# Patient Record
Sex: MALE | Race: WHITE
[De-identification: names, ages, dates, MRNs, and addresses within clinical notes are randomized per-mention and may not be internally consistent; named-entity substitution may affect disease eponyms.]

---

## 2018-07-31 ENCOUNTER — HOSPITAL ENCOUNTER (OUTPATIENT)
Dept: HOSPITAL 45 - C.CPL | Age: 58
Discharge: HOME | End: 2018-07-31
Attending: ORTHOPAEDIC SURGERY
Payer: COMMERCIAL
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
## 2018-07-31 DIAGNOSIS — Z01.812: ICD-10-CM

## 2018-07-31 DIAGNOSIS — Z01.810: ICD-10-CM

## 2018-07-31 DIAGNOSIS — S43.086A: Primary | ICD-10-CM

## 2018-07-31 LAB
BASOPHILS # BLD: 0.03 K/UL (ref 0–0.2)
BASOPHILS NFR BLD: 0.4 %
BUN SERPL-MCNC: 14 MG/DL (ref 7–18)
CALCIUM SERPL-MCNC: 8.6 MG/DL (ref 8.5–10.1)
CO2 SERPL-SCNC: 29 MMOL/L (ref 21–32)
CREAT SERPL-MCNC: 1.02 MG/DL (ref 0.6–1.4)
EOS ABS #: 0.28 K/UL (ref 0–0.5)
EOSINOPHIL NFR BLD AUTO: 163 K/UL (ref 130–400)
GLUCOSE SERPL-MCNC: 213 MG/DL (ref 70–99)
HCT VFR BLD CALC: 46.9 % (ref 42–52)
HGB BLD-MCNC: 16.6 G/DL (ref 14–18)
IG#: 0.02 K/UL (ref 0–0.02)
IMM GRANULOCYTES NFR BLD AUTO: 23.5 %
LYMPHOCYTES # BLD: 1.84 K/UL (ref 1.2–3.4)
MCH RBC QN AUTO: 31.6 PG (ref 25–34)
MCHC RBC AUTO-ENTMCNC: 35.4 G/DL (ref 32–36)
MCV RBC AUTO: 89.3 FL (ref 80–100)
MONO ABS #: 0.48 K/UL (ref 0.11–0.59)
MONOCYTES NFR BLD: 6.1 %
NEUT ABS #: 5.18 K/UL (ref 1.4–6.5)
NEUTROPHILS # BLD AUTO: 3.6 %
NEUTROPHILS NFR BLD AUTO: 66.1 %
PMV BLD AUTO: 9.9 FL (ref 7.4–10.4)
POTASSIUM SERPL-SCNC: 3.8 MMOL/L (ref 3.5–5.1)
RED CELL DISTRIBUTION WIDTH CV: 14.1 % (ref 11.5–14.5)
RED CELL DISTRIBUTION WIDTH SD: 46.1 FL (ref 36.4–46.3)
SODIUM SERPL-SCNC: 134 MMOL/L (ref 136–145)
WBC # BLD AUTO: 7.83 K/UL (ref 4.8–10.8)

## 2018-07-31 NOTE — PAT MEDICATION INSTRUCTIONS
Service Date


Jul 31, 2018.





Current Home Medication List


Aspirin (Aspirin), 1 TAB PO QAM


Atorvastatin (Lipitor), 80 MG PO HS


Ezetimibe (Zetia), 10 MG PO HS


Lansoprazole (Prevacid), 15 MG PO QAM


Metoprolol Tartrate (Lopressor) (Lopressor), 25 MG PO BID


Multivitamin (Multivitamin), 1 TAB PO QAM


Nitroglycerin (Nitrostat), 0.4 MG UT PRN


Tramadol (Ultram), 50 MG PO Q4H PRN for Pain


Triamterene/Hctz (Triamterene/Hctz 37.5-25MG), 1 TAB PO QAM





Medication Instructions


For Your Scheduled Surgery 





-Continue as needed for chest pain:


Nitroglycerin (Nitrostat), 0.4 MG UT PRN








- Hold the following medications the morning of surgery:


Multivitamin (Multivitamin), 1 TAB PO QAM


Triamterene/Hctz (Triamterene/Hctz 37.5-25MG), 1 TAB PO QAM








- Take the following medications the morning of surgery with a sip of water:


Aspirin (Aspirin), 1 TAB PO QAM


Lansoprazole (Prevacid), 15 MG PO QAM


Metoprolol Tartrate (Lopressor) (Lopressor), 25 MG PO BID


Tramadol (Ultram), 50 MG PO Q4H PRN for Pain (if needed, may be taken up to 

four hours before surgery)








- Take the following medications as scheduled the night before surgery:


Atorvastatin (Lipitor), 80 MG PO HS


Ezetimibe (Zetia), 10 MG PO HS


Metoprolol Tartrate (Lopressor) (Lopressor), 25 MG PO BID


Tramadol (Ultram), 50 MG PO Q4H PRN for Pain (if needed)








If you have any questions please call us at [phone] or [phone] or 

[phone]

## 2018-07-31 NOTE — DIAGNOSTIC IMAGING REPORT
CHEST 2 VIEWS ROUTINE



CLINICAL HISTORY: PAT preoperative evaluation



COMPARISON STUDY:  12/23/2013



FINDINGS: Prior median sternotomy. Lungs are clear. Diaphragms smooth. 



IMPRESSION:  No acute process. 











The above report was generated using voice recognition software.  It may contain

grammatical, syntax or spelling errors.









Electronically signed by:  Baron Moe M.D.

7/31/2018 10:36 AM



Dictated Date/Time:  7/31/2018 10:35 AM

## 2018-08-02 ENCOUNTER — HOSPITAL ENCOUNTER (OUTPATIENT)
Dept: HOSPITAL 45 - X.SURG | Age: 58
Discharge: HOME | End: 2018-08-02
Attending: ORTHOPAEDIC SURGERY
Payer: COMMERCIAL

## 2018-08-02 VITALS
WEIGHT: 183.87 LBS | BODY MASS INDEX: 27.87 KG/M2 | WEIGHT: 183.87 LBS | BODY MASS INDEX: 27.87 KG/M2 | BODY MASS INDEX: 27.87 KG/M2 | HEIGHT: 67.99 IN | HEIGHT: 67.99 IN

## 2018-08-02 VITALS — HEART RATE: 89 BPM | SYSTOLIC BLOOD PRESSURE: 127 MMHG | OXYGEN SATURATION: 94 % | DIASTOLIC BLOOD PRESSURE: 81 MMHG

## 2018-08-02 VITALS — TEMPERATURE: 97.16 F

## 2018-08-02 DIAGNOSIS — I25.2: ICD-10-CM

## 2018-08-02 DIAGNOSIS — Z95.1: ICD-10-CM

## 2018-08-02 DIAGNOSIS — K21.9: ICD-10-CM

## 2018-08-02 DIAGNOSIS — F17.200: ICD-10-CM

## 2018-08-02 DIAGNOSIS — M19.90: ICD-10-CM

## 2018-08-02 DIAGNOSIS — E78.5: ICD-10-CM

## 2018-08-02 DIAGNOSIS — M25.311: ICD-10-CM

## 2018-08-02 DIAGNOSIS — S46.011A: Primary | ICD-10-CM

## 2018-08-02 DIAGNOSIS — Z79.82: ICD-10-CM

## 2018-08-02 DIAGNOSIS — Z79.899: ICD-10-CM
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
## 2018-08-02 DIAGNOSIS — I25.10: ICD-10-CM

## 2018-08-02 NOTE — ANESTHESIA PROGRESS NT - [facility]
Anesthesia Post Op Note


Date & Time


Aug 2, 2018 at 12:35





Vital Signs


Pain Intensity:  0





Vital Signs Past 12 Hours








  Date Time  Temp Pulse Resp B/P (MAP) Pulse Ox O2 Delivery O2 Flow Rate FiO2


 


8/2/18 12:26 36.1    92 Room Air  


 


8/2/18 12:21    136/85    


 


8/2/18 12:19  87 19  91   


 


8/2/18 12:19  87 19     


 


8/2/18 12:17    138/73    


 


8/2/18 12:14  88 28  97   


 


8/2/18 12:14  89 28     


 


8/2/18 12:13  72 18  96   


 


8/2/18 12:13  72 18     


 


8/2/18 12:11    136/88    


 


8/2/18 12:08  78 22  98   


 


8/2/18 12:08  77 22     


 


8/2/18 12:06    159/90    


 


8/2/18 12:05 36.3 97 16 159/90 96 Mask 7 


 


8/2/18 10:18  70 21  97   


 


8/2/18 10:18  71      


 


8/2/18 10:17  70 21  98   


 


8/2/18 10:17  71      


 


8/2/18 10:17  70 21  98   


 


8/2/18 10:17  71      


 


8/2/18 10:16    117/82    


 


8/2/18 10:16    117/82    


 


8/2/18 10:12  67 25  99   


 


8/2/18 10:12  66      


 


8/2/18 10:12  67 25  99   


 


8/2/18 10:12  66      


 


8/2/18 10:11  65      


 


8/2/18 10:11  66 0 143/96 96   


 


8/2/18 10:06  63 0  95   


 


8/2/18 10:06  60      


 


8/2/18 10:03    127/90    


 


8/2/18 10:01  56 0  96   


 


8/2/18 10:01  56      


 


8/2/18 09:56  66      


 


8/2/18 09:56  65 0  95   


 


8/2/18 09:51  72      


 


8/2/18 09:51  69 0  95   


 


8/2/18 09:46  61 0  94   


 


8/2/18 09:46  62      


 


8/2/18 09:41  62 0  94   


 


8/2/18 09:41  63      


 


8/2/18 09:36  57 0  95   


 


8/2/18 09:36  55      


 


8/2/18 09:31  57      


 


8/2/18 09:31  56 0  94   


 


8/2/18 09:26  59      


 


8/2/18 09:26  63 0  95   


 


8/2/18 09:21  64      


 


8/2/18 09:21  64 0  96   


 


8/2/18 09:16  64      


 


8/2/18 09:16  64 0  96   


 


8/2/18 08:26 36.5 69 18 142/95 (111) 96 Room Air  











Notes


Mental Status:  alert / awake / arousable, participated in evaluation


Pt Amnestic to Procedure:  Yes


Nausea / Vomiting:  adequately controlled


Pain:  adequately controlled


Airway Patency, RR, SpO2:  stable & adequate


BP & HR:  stable & adequate


Hydration State:  stable & adequate


Anesthetic Complications:  no major complications apparent

## 2018-08-02 NOTE — [facility] POST OPERATIVE BRIEF NOTE
Immediate Operative Summary


Operative Date


Aug 2, 2018.





Pre-Operative Diagnosis





RIGHT SHOULDER FULL THICKNESS ROTATOR CUFF TEAR





Post-Operative Diagnosis





SAME AS PREOP





Procedure(s) Performed





Right Shoulder Arthroscopy With Medium Rotator Cuff Repair WITH EXTENSIVE 


DEBRIDEMENT





Surgeon


DR. KENNETH AZEEVDO





Assistant Surgeon(s)


IAM GRANDE PA-C





Estimated Blood Loss


5 ML





Findings


Consistent with Post-Op Diagnosis





Specimens





none





Anesthesia Type


[facility]

## 2018-08-02 NOTE — OPERATIVE REPORT
DATE OF OPERATION:  08/02/2018

 

PREOPERATIVE DIAGNOSES:  Recurrent rotator cuff tear and anterior instability

of the right shoulder.

 

POSTOPERATIVE DIAGNOSES:  Recurrent rotator cuff tear and anterior

instability of the right shoulder.

 

PROCEDURE:  Right shoulder diagnostic arthroscopy with extensive debridement

of rotator cuff repair.

 

SURGEON:  Dr. Arian Cristobal.

 

ASSISTANT:  Blayne Souza PA-C, whose assistance was necessary for

positioning the arm and help with instrumentation and closure.

 

ANESTHESIA:  General with a right interscalene nerve block.

 

COMPLICATIONS:  None.

 

CONDITION:  Stable to PACU.

 

INDICATIONS:  Asad is a pleasant 58-year-old male who I did an open

anterior superior rotator cuff repair on several years ago.  He did very well

postoperatively.  Unfortunately, a couple weeks ago, he fell hard, dislocated

his right shoulder.  MRI showed a retear of the supraspinatus.  The

subscapularis looked intact.  Unfortunately, he had another dislocation a

couple weeks later and elected to undergo arthroscopic fixation.

 

OPERATION AND FINDINGS:  On 08/02/2018, he arrived at [facility] for the above procedure.  He was seen in the preoperative holding area

and the operative extremity was identified and signed.  He was given a

preoperative antibiotic and a right interscalene nerve block.  He was taken

back to the operating room, laid on the table in supine position and put

under general anesthesia.  He was then put into the beachchair position.  The

right shoulder was prepped and draped in sterile fashion.  Time-out was done

and the patient's operative extremity was properly identified.

 

A scope was introduced in the posterior portal.  Diagnostic arthroscopy

showed some minor grade 1 chondral changes on the humeral head.  There were

no bony defects of the glenoid.  There was a Hill-Sachs lesion almost in the

superior aspect of the humeral head.  The subscapularis was intact.  There

was a tear in the entire supraspinatus and it seemed to go up the anterior

interval.  The infraspinatus and teres minor were intact.  An anterior portal

was made.  A shaver was used to do a debridement of some of the

intra-articular structures to better define the anatomy.  Multiple pictures

were taken.  The scope was then brought into the subacromial space.  A

lateral portal was made.  A shaver was used to do a complete subacromial and

subdeltoid bursectomy.  An additional anterolateral portal was made and

Ramya cannulas were placed in each lateral portals.  The greater tuberosity

was prepared with a ring curette and a microfracture.  The rotator cuff was

then repaired with an Arthrex SpeedBridge configuration using 4.75 mm

BioComposite SwiveLock suture anchors and FiberTapes.  This gave a nice

knotless SpeedBridge repair.  An additional suture was placed in the very

lateral rotator interval.  This seemed to close up very nicely without

compromising much external rotation.  I did want to tighten him up just a

little bit.  The scope was placed back into the glenohumeral joint.  The

articular margin of the rotator cuff has been restored.  The anterior

inferior labrum was examined.  It was mostly absent and there was no good

soft tissue to even consider repair.  I think given his age and given the

rotator cuff repair, I think that will solve his instability problems as

well.  The arthroscopic instruments were removed from the shoulder.  Portal

sites were closed with 3-0 nylon.  He was then placed in a soft dressing and

abduction arm sling.  He was then extubated, transferred to a [facility] and

taken to postanesthesia care unit in stable condition.  He tolerated the

procedure well.

 

 

I attest to the content of the Intraoperative Record and any orders documented therein. Any exception
s are noted below.

## 2018-08-02 NOTE — DISCHARGE INSTRUCTIONS-[facility]
Discharge Instructions


Date of Service


Aug 2, 2018.





Visit


Reason for Visit:  Right Shoulder Full Thickness Rotator Cuff Tear





Discharge


Discharge Diagnosis / Problem:  SAME AS ABOVE





Discharge Goals


Goal(s):  Decrease discomfort, Improve function





Activity Recommendations


Activity Limitations:  as noted below


Lifting Limitations:  until after follow-up appointment


Exercise/Sports Limitations:  until after follow-up appointment





Anesthesia


.





Post Anesthesia Instructions:





If you have had General Anesthesia or IV Sedation:





*  Do not drive today.


*  Resume driving when surgeon permits.


*  Do not make important decisions or sign legal documents today.


*  Call surgeon for:





   1.  Temperature elevations greater than 101 degrees F.


   2.  Uncontrollable pain.


   3.  Excessive bleeding.


   4.  Persistent nausea and vomiting.


   5.  Medication intolerance (nausea, vomiting or rash).





*  For nausea and vomiting use only clear liquids such as: tea, soda, bouillon 

until nausea subsides, then gradually increase diet as tolerated.





*  If you have any concerns or questions, call your surgeon's office.  If 

physician is unavailable and it is an emergency, call 911 or go to the nearest 

emergency room.





.





Instructions / Follow-Up


Instructions / Follow-Up





MEDICATIONS:





*  Resume previous medications unless instructed otherwise by your surgeon.





*  Always take pain medication on a full stomach or with food to avoid upset 

stomach. 





*  Do not drink alcohol or drive while taking narcotics.





*  Ibuprofen or Tylenol may be taken if narcotic not needed.








SPECIAL CARE INSTRUCTIONS:





__ None





_X_ Keep extremity elevated and iced x 48 hours; apply ice 20-30 


    minutes 8-10 times/day. May remove at night.





__ Sling


    __24 hrs/day


    __ Remove at night





_X_ Shoulder Immobilizer (MAY REMOVE AFTER 48 HOURS ONLY TO SHOWER) 


    _X_ 24 hrs/day


    __ Remove at night





_X_ Dressing


    __ Maintain until seen in office, may shower with plastic over site


    _X_ Remove dressings in 24-48 hours and then may shower


    _X_ Cover incisions with band-aids after showering


    __ Do not remove steri-strips





Call physician if chills or temperature rises above 102 degrees or pain


unrelieved by prescribed pain medications at [phone].


.





Diet Recommendations


Home Diet:  no limitations


Fluid Restriction:  None





Procedures


Procedures Performed:  


Right Shoulder Arthroscopy With Medium Rotator Cuff Repair WITH EXTENSIVE 


DEBRIDEMENT





Pending Studies


Studies pending at discharge:  no





Work Instructions


Return To Work:  after follow-up


Lifting Limitations:  NO LIFTING WITH RIGHT ARM





Medical Emergencies








.


Who to Call and When:





Medical Emergencies:  If at any time you feel your situation is an emergency, 

please call 911 immediately.





.





Non-Emergent Contact


Non-Emergency issues call your:  Surgeon


Call Non-Emergent contact if:  your pain is not controlled, wound has increased 

drainage, wound has increased redness





.


.








"Provider Documentation" section prepared by Blayne Souza.








.